# Patient Record
Sex: MALE | Race: WHITE | NOT HISPANIC OR LATINO | Employment: STUDENT | URBAN - METROPOLITAN AREA
[De-identification: names, ages, dates, MRNs, and addresses within clinical notes are randomized per-mention and may not be internally consistent; named-entity substitution may affect disease eponyms.]

---

## 2023-04-07 RX ORDER — DOXYCYCLINE 100 MG/1
100 CAPSULE ORAL 2 TIMES DAILY
Qty: 60 CAPSULE | Refills: 0 | Status: CANCELLED | OUTPATIENT
Start: 2023-04-07 | End: 2023-05-07

## 2023-04-07 RX ORDER — DOXYCYCLINE 100 MG/1
CAPSULE ORAL
Qty: 60 CAPSULE | OUTPATIENT
Start: 2023-04-07

## 2023-04-07 RX ORDER — DOXYCYCLINE 100 MG/1
100 CAPSULE ORAL 2 TIMES DAILY
COMMUNITY
End: 2023-05-10 | Stop reason: SDUPTHER

## 2023-04-07 NOTE — TELEPHONE ENCOUNTER
Please let mom know I would like Del to have a well visit prior to refilling this medication -- I usually do not chronically refill Doxycycline.

## 2023-04-07 NOTE — TELEPHONE ENCOUNTER
Advised mother that physician will not refill medication until [facility]. There is an appointment scheduled for 5/10/23.

## 2023-05-10 ENCOUNTER — OFFICE VISIT (OUTPATIENT)
Dept: PEDIATRICS | Facility: CLINIC | Age: 18
End: 2023-05-10
Payer: COMMERCIAL

## 2023-05-10 VITALS
SYSTOLIC BLOOD PRESSURE: 127 MMHG | WEIGHT: 180.56 LBS | BODY MASS INDEX: 25.28 KG/M2 | HEIGHT: 71 IN | DIASTOLIC BLOOD PRESSURE: 82 MMHG | HEART RATE: 65 BPM

## 2023-05-10 DIAGNOSIS — R45.89 DEPRESSED MOOD: ICD-10-CM

## 2023-05-10 DIAGNOSIS — Z00.129 ENCOUNTER FOR ROUTINE CHILD HEALTH EXAMINATION WITHOUT ABNORMAL FINDINGS: Primary | ICD-10-CM

## 2023-05-10 DIAGNOSIS — L70.0 ACNE VULGARIS: ICD-10-CM

## 2023-05-10 DIAGNOSIS — E55.9 VITAMIN D DEFICIENCY: ICD-10-CM

## 2023-05-10 PROBLEM — R10.9 ABDOMINAL PAIN: Status: RESOLVED | Noted: 2023-05-10 | Resolved: 2023-05-10

## 2023-05-10 PROBLEM — K58.9 IBS (IRRITABLE BOWEL SYNDROME): Status: ACTIVE | Noted: 2023-05-10

## 2023-05-10 PROCEDURE — 99395 PREV VISIT EST AGE 18-39: CPT | Performed by: PEDIATRICS

## 2023-05-10 PROCEDURE — 3008F BODY MASS INDEX DOCD: CPT | Performed by: PEDIATRICS

## 2023-05-10 PROCEDURE — 99177 OCULAR INSTRUMNT SCREEN BIL: CPT | Performed by: PEDIATRICS

## 2023-05-10 RX ORDER — ONDANSETRON 4 MG/1
TABLET, ORALLY DISINTEGRATING ORAL EVERY 8 HOURS
COMMUNITY
Start: 2016-06-27 | End: 2023-05-10 | Stop reason: ALTCHOICE

## 2023-05-10 RX ORDER — DOXYCYCLINE 100 MG/1
100 CAPSULE ORAL 2 TIMES DAILY
Qty: 120 CAPSULE | Refills: 0 | Status: SHIPPED | OUTPATIENT
Start: 2023-05-10 | End: 2023-07-09

## 2023-05-10 RX ORDER — HYOSCYAMINE SULFATE 0.12 MG/1
TABLET SUBLINGUAL
COMMUNITY
Start: 2016-02-17 | End: 2023-05-10 | Stop reason: ALTCHOICE

## 2023-05-10 NOTE — PROGRESS NOTES
Subjective         Patient Issues:  Questions or concerns:  either none, or only commonly asked age-specific questions, although in review of his PHQ-9 he describes symptoms of mild depression with anhedonia, and not wanting to be around friends.  No sleep issues.  He denies any thoughts of self harm.    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  Teen questionnaire:  reviewed  Activities:  weight lifting    Confidential Adolescent Questionnaire Reviewed and Discussed      Objective   Growth chart reviewed.  General:  Well-appearing  Well-hydrated  No acute distress   Head:  Normocephalic   Eyes:  Lids and conjunctivae normal  Sclerae white  Pupils equal and reactive   ENT:  Ears:  TMs normal bilaterally  Mouth:  mucosa moist; no visible lesions  Throat:  OP moist and clear; uvula midline  Neck:  supple; no thyroid enlargement   Respiratory:  Respiratory rate:  normal  Air exchange:  normal   Adventitious breath sounds:  none  Accessory muscle use:  none   Heart:  Rate and rhythm:  regular  Murmur:  none    Abdomen:  Palpation:  soft, non-tender, non-distended, no masses  Organs:  no HSM  Bowel sounds:  normal   :  Normal external genitalia  Benjamin stage:  V   MSK: Range of motion:  grossly normal in all joints  Swelling:  none  Muscle bulk and strength:  grossly normal   Skin:  Warm and well-perfused  No rashes, moderate acne on cheeks   Lymphatic: No nodes larger than 1 cm palpated  No firm or fixed nodes palpated   Neuro:  Alert  Moves all extremities spontaneously  CN:  grossly intact  Tone:  normal      Assessment/Plan   James Wagner is a healthy and thriving teenager.    - Anticipatory  guidance regarding development, safety, nutrition, physical activity, and sleep reviewed.  - Growth:  appropriate for age  - Development:  active and social   - Social:  Appropriate for age.  Confidential adolescent questionnaire reviewed and discussed. Age appropriate anticipatory guidance given.  - Vaccines:  as documented  - Return in 1 year for annual well exam or sooner if concerns arise.  -For acne Del will take Doxcycycline bid for 2 months and start Differin at bedtime (use/safety of both reviewed)  Vitamin D and lipid panel ordered  -Del scored a 12 on his PHQ9 today and he endorses mildly depressed mood without thoughts of self-harm.  He was given resources for counseling to start and will call if his mood worsens.  -Optometry referral due to failed hearing screen

## 2023-05-10 NOTE — PATIENT INSTRUCTIONS
Take Doxcycycline one pill twice a day for 2 months  Wash your face twice a day with an acne wash  In the morning, use Benzoyl Peroxide (see acne information)  In the evening, use Differin as we discussed    Labs were ordered -- you can go to any [facility] lab to have them drawn.  Please do them first thing in the morning without eating/drinking.    Options for counseling include:  [facility] [phone]  [facility] [phone]  [facility] [phone]

## 2023-08-02 ENCOUNTER — OFFICE VISIT (OUTPATIENT)
Dept: PEDIATRICS | Facility: CLINIC | Age: 18
End: 2023-08-02
Payer: COMMERCIAL

## 2023-08-02 DIAGNOSIS — M25.512 LEFT SHOULDER PAIN, UNSPECIFIED CHRONICITY: ICD-10-CM

## 2023-08-02 PROCEDURE — 99213 OFFICE O/P EST LOW 20 MIN: CPT | Performed by: PEDIATRICS

## 2023-08-02 PROCEDURE — 3008F BODY MASS INDEX DOCD: CPT | Performed by: PEDIATRICS

## 2023-08-02 RX ORDER — DOXYCYCLINE 100 MG/1
100 CAPSULE ORAL 2 TIMES DAILY
COMMUNITY
Start: 2023-07-11

## 2023-08-02 NOTE — PROGRESS NOTES
James Wagner is a 18 y.o. who presents for Shoulder Pain (L shoulder).      HPI  Left shoulder pain for the last few months. It was hurting due to weight lifting, and then was hit during rugby and acutely injured it.  Xray was negative 11/22, although the injury occurred well after that.  He had one session of PT last year.  He returns to school in a few weeks.  He has pain during the day and has difficulty weight weight lifting.      Objective   There were no vitals taken for this visit.    Physical Exam  Gen: well appearing  Left shoulder: no TTP.  There is a small protruberance at the lateral end of the left clavicle.  Shoulder with normal ROM. Negative scarf sign. Normal rotator cuff testing.    Assessment/Plan   Del has some swelling and pain around the AC joint which may be consistent with an AC joint sprain.  Repeat xrays were ordered.  Given the chronicity of his symptoms, he was also referred to orthopedics for further evaluation.    Today we discussed a typical course of illness, symptomatic treatment, and signs of worsening/when to seek medical care.